# Patient Record
Sex: MALE | Race: WHITE | Employment: FULL TIME | URBAN - NONMETROPOLITAN AREA
[De-identification: names, ages, dates, MRNs, and addresses within clinical notes are randomized per-mention and may not be internally consistent; named-entity substitution may affect disease eponyms.]

---

## 2018-07-01 ENCOUNTER — HOSPITAL ENCOUNTER (EMERGENCY)
Age: 63
Discharge: HOME | End: 2018-07-01
Attending: EMERGENCY MEDICINE
Payer: COMMERCIAL

## 2018-07-01 VITALS
HEART RATE: 89 BPM | BODY MASS INDEX: 42.84 KG/M2 | TEMPERATURE: 97.5 F | OXYGEN SATURATION: 95 % | SYSTOLIC BLOOD PRESSURE: 162 MMHG | WEIGHT: 282.7 LBS | HEIGHT: 68 IN | DIASTOLIC BLOOD PRESSURE: 68 MMHG | RESPIRATION RATE: 20 BRPM

## 2018-07-01 DIAGNOSIS — H61.22 HEARING LOSS OF LEFT EAR DUE TO CERUMEN IMPACTION: Primary | ICD-10-CM

## 2018-07-01 PROCEDURE — 99283 EMERGENCY DEPT VISIT LOW MDM: CPT

## 2018-07-01 RX ORDER — METHOCARBAMOL 750 MG/1
750 TABLET, FILM COATED ORAL 2 TIMES DAILY PRN
COMMUNITY

## 2018-07-01 RX ORDER — FEXOFENADINE HCL 180 MG/1
180 TABLET ORAL DAILY
COMMUNITY

## 2018-07-01 RX ORDER — AMLODIPINE BESYLATE 10 MG/1
10 TABLET ORAL DAILY
COMMUNITY

## 2018-07-01 RX ORDER — LISINOPRIL 10 MG/1
10 TABLET ORAL DAILY
COMMUNITY

## 2018-07-01 RX ORDER — ROSUVASTATIN CALCIUM 40 MG/1
40 TABLET, COATED ORAL EVERY EVENING
COMMUNITY

## 2018-07-01 NOTE — ED PROVIDER NOTES
eMERGENCY dEPARTMENT eNCOUnter        [facility]    Chief Complaint   Patient presents with    Cerumen Impaction     left ear       HPI    Kevin Nelson is a 70 y.o. male who presents to ED from home. By car. With complaint of Left ear difficulty hearing. Onset for several days  Intensity of symptoms difficulty hearing  Location of symptoms left ear. REVIEW OF SYSTEMS    All systems reviewed and positives are in the HPI      PAST MEDICAL HISTORY    Past Medical History:   Diagnosis Date    Diabetes mellitus ([address]     Hyperlipidemia     Hypertension        SURGICAL HISTORY    Past Surgical History:   Procedure Laterality Date    TONSILLECTOMY         CURRENT MEDICATIONS    Current Outpatient Rx   Medication Sig Dispense Refill    lisinopril (PRINIVIL;ZESTRIL) 10 MG tablet Take 10 mg by mouth daily      rosuvastatin (CRESTOR) 40 MG tablet Take 40 mg by mouth every evening      amLODIPine (NORVASC) 10 MG tablet Take 10 mg by mouth daily      metFORMIN (GLUCOPHAGE) 500 MG tablet Take 1,000 mg by mouth 2 times daily (with meals)      fexofenadine (ALLEGRA) 180 MG tablet Take 180 mg by mouth daily      aspirin 81 MG tablet Take 81 mg by mouth daily      methocarbamol (ROBAXIN) 750 MG tablet Take 750 mg by mouth 2 times daily as needed         ALLERGIES    No Known Allergies    FAMILY HISTORY    No family history on file.     SOCIAL HISTORY    Social History     Social History    Marital status:      Spouse name: N/A    Number of children: N/A    Years of education: N/A     Social History Main Topics    Smoking status: Former Smoker    Smokeless tobacco: Never Used    Alcohol use Not on file    Drug use: Unknown    Sexual activity: Not on file     Other Topics Concern    Not on file     Social History Narrative    No narrative on file       PHYSICAL EXAM    VITAL SIGNS: BP (!) 162/68   Pulse 89   Temp 97.5 °F (36.4 °C) (Oral)   Resp 20   Ht 5' 8\" (1.727 m)   Wt 282 lb 11.2 oz